# Patient Record
Sex: MALE | Race: WHITE | Employment: FULL TIME | URBAN - METROPOLITAN AREA
[De-identification: names, ages, dates, MRNs, and addresses within clinical notes are randomized per-mention and may not be internally consistent; named-entity substitution may affect disease eponyms.]

---

## 2017-09-15 ENCOUNTER — APPOINTMENT (OUTPATIENT)
Dept: OTHER | Facility: HOSPITAL | Age: 22
End: 2017-09-15
Attending: PREVENTIVE MEDICINE

## 2020-12-15 ENCOUNTER — OFFICE VISIT (OUTPATIENT)
Dept: ORTHOPEDICS CLINIC | Facility: CLINIC | Age: 25
End: 2020-12-15
Payer: COMMERCIAL

## 2020-12-15 DIAGNOSIS — M51.36 DDD (DEGENERATIVE DISC DISEASE), LUMBAR: Primary | ICD-10-CM

## 2020-12-15 DIAGNOSIS — M51.16 RADICULOPATHY DUE TO LUMBAR INTERVERTEBRAL DISC DISORDER: ICD-10-CM

## 2020-12-15 PROCEDURE — 99212 OFFICE O/P EST SF 10 MIN: CPT | Performed by: ORTHOPAEDIC SURGERY

## 2020-12-15 RX ORDER — CHLORAL HYDRATE 500 MG
CAPSULE ORAL
COMMUNITY

## 2020-12-15 RX ORDER — B-COMPLEX WITH VITAMIN C
1 TABLET ORAL DAILY
COMMUNITY

## 2020-12-15 RX ORDER — NAPROXEN 500 MG/1
500 TABLET ORAL 2 TIMES DAILY WITH MEALS
COMMUNITY
Start: 2020-07-14

## 2020-12-15 NOTE — PROGRESS NOTES
Patient is a 18-year-old white male here for follow-up. Patient had a previous diagnosis of degenerative disc disease and radiculopathy of his left leg. Patient had had surgery on his back over a year ago.   He had had a microdiscectomy and laminectomy of

## 2020-12-18 ENCOUNTER — HOSPITAL ENCOUNTER (OUTPATIENT)
Dept: MRI IMAGING | Facility: HOSPITAL | Age: 25
Discharge: HOME | End: 2020-12-18
Attending: ORTHOPAEDIC SURGERY
Payer: COMMERCIAL

## 2020-12-18 DIAGNOSIS — M51.36 DDD (DEGENERATIVE DISC DISEASE), LUMBAR: ICD-10-CM

## 2020-12-18 DIAGNOSIS — M51.16 RADICULOPATHY DUE TO LUMBAR INTERVERTEBRAL DISC DISORDER: ICD-10-CM

## 2020-12-18 PROCEDURE — 72148 MRI LUMBAR SPINE W/O DYE: CPT | Performed by: ORTHOPAEDIC SURGERY

## 2021-01-20 ENCOUNTER — OFFICE VISIT (OUTPATIENT)
Dept: ORTHOPEDICS CLINIC | Facility: CLINIC | Age: 26
End: 2021-01-20
Payer: COMMERCIAL

## 2021-01-20 DIAGNOSIS — M51.16 RADICULOPATHY DUE TO LUMBAR INTERVERTEBRAL DISC DISORDER: ICD-10-CM

## 2021-01-20 DIAGNOSIS — M51.36 DDD (DEGENERATIVE DISC DISEASE), LUMBAR: Primary | ICD-10-CM

## 2021-01-20 DIAGNOSIS — M51.26 POSTERIOR HERNIATION OF LUMBAR DISC: ICD-10-CM

## 2021-01-20 PROCEDURE — 99213 OFFICE O/P EST LOW 20 MIN: CPT | Performed by: ORTHOPAEDIC SURGERY

## 2021-01-20 RX ORDER — HYDROCODONE BITARTRATE AND ACETAMINOPHEN 5; 325 MG/1; MG/1
1 TABLET ORAL EVERY 6 HOURS PRN
COMMUNITY

## 2021-01-20 NOTE — PROGRESS NOTES
Patient is a 60-year-old white male here for follow-up on his MRI scan of his lumbar spine. Patient has had prior surgery of his back for a disc herniation at L5-S1. That surgery was approximately a year and 1/2 to 2 years ago.   Subsequent to this he has

## 2021-01-22 ENCOUNTER — TELEPHONE (OUTPATIENT)
Dept: ORTHOPEDICS CLINIC | Facility: CLINIC | Age: 26
End: 2021-01-22

## 2021-01-22 NOTE — TELEPHONE ENCOUNTER
Per Dr. Shawanda Quevedo note, referred to back specialist and was given the names of 2 orthopedic surgeons who do back surgery and to neurosurgeon who does back surgery. Also given name of Dr. Ashlee escobar at [facility].       You advised pt to follow up with you a

## 2021-01-22 NOTE — TELEPHONE ENCOUNTER
I called and spoke with Richard Brock telling him it would be best to see one of the back surgeons at this point. Patient verbalized understanding.

## 2021-01-22 NOTE — TELEPHONE ENCOUNTER
Patient feels like he did some damage to his back further- he feels like he needs to be seen or needs another MRI - Please advise

## 2022-01-25 ENCOUNTER — APPOINTMENT (OUTPATIENT)
Dept: OTHER | Facility: HOSPITAL | Age: 27
End: 2022-01-25
Attending: PREVENTIVE MEDICINE

## 2024-02-27 ENCOUNTER — OFFICE VISIT (OUTPATIENT)
Dept: OTHER | Facility: HOSPITAL | Age: 29
End: 2024-02-27
Attending: NURSE PRACTITIONER

## 2024-02-27 DIAGNOSIS — Z00.00 ROUTINE GENERAL MEDICAL EXAMINATION AT A HEALTH CARE FACILITY: Primary | ICD-10-CM

## 2024-02-28 ENCOUNTER — HOSPITAL ENCOUNTER (OUTPATIENT)
Dept: CV DIAGNOSTICS | Facility: HOSPITAL | Age: 29
Discharge: HOME | End: 2024-02-28
Attending: NURSE PRACTITIONER

## 2024-02-28 ENCOUNTER — HOSPITAL ENCOUNTER (OUTPATIENT)
Dept: GENERAL RADIOLOGY | Facility: HOSPITAL | Age: 29
Discharge: HOME | End: 2024-02-28
Attending: EMERGENCY MEDICINE

## 2024-02-28 ENCOUNTER — OFFICE VISIT (OUTPATIENT)
Dept: OTHER | Facility: HOSPITAL | Age: 29
End: 2024-02-28
Attending: NURSE PRACTITIONER

## 2024-02-28 DIAGNOSIS — Z00.00 ROUTINE GENERAL MEDICAL EXAMINATION AT A HEALTH CARE FACILITY: ICD-10-CM

## 2024-02-28 DIAGNOSIS — Z00.00 ROUTINE GENERAL MEDICAL EXAMINATION AT A HEALTH CARE FACILITY: Primary | ICD-10-CM

## 2024-02-28 LAB
% OF MAX PREDICTED HR: 100 %
ALBUMIN SERPL-MCNC: 4.5 G/DL (ref 3.2–4.8)
ALBUMIN/GLOB SERPL: 1.4 {RATIO} (ref 1–2)
ALP LIVER SERPL-CCNC: 84 U/L
ALT SERPL-CCNC: 19 U/L
ANION GAP SERPL CALC-SCNC: 6 MMOL/L (ref 0–18)
AST SERPL-CCNC: 21 U/L (ref ?–34)
BASOPHILS # BLD AUTO: 0.03 X10(3) UL (ref 0–0.2)
BASOPHILS NFR BLD AUTO: 0.6 %
BILIRUB SERPL-MCNC: 0.7 MG/DL (ref 0.3–1.2)
BILIRUB UR QL: NEGATIVE
BUN BLD-MCNC: 16 MG/DL (ref 9–23)
BUN/CREAT SERPL: 16 (ref 10–20)
CALCIUM BLD-MCNC: 9.3 MG/DL (ref 8.7–10.4)
CHLORIDE SERPL-SCNC: 109 MMOL/L (ref 98–112)
CHOLEST SERPL-MCNC: 135 MG/DL (ref ?–200)
CLARITY UR: CLEAR
CO2 SERPL-SCNC: 27 MMOL/L (ref 21–32)
CREAT BLD-MCNC: 1 MG/DL
DEPRECATED RDW RBC AUTO: 44.7 FL (ref 35.1–46.3)
EGFRCR SERPLBLD CKD-EPI 2021: 106 ML/MIN/1.73M2 (ref 60–?)
EOSINOPHIL # BLD AUTO: 0.15 X10(3) UL (ref 0–0.7)
EOSINOPHIL NFR BLD AUTO: 2.8 %
ERYTHROCYTE [DISTWIDTH] IN BLOOD BY AUTOMATED COUNT: 12.4 % (ref 11–15)
FASTING PATIENT LIPID ANSWER: YES
FASTING STATUS PATIENT QL REPORTED: YES
GLOBULIN PLAS-MCNC: 3.2 G/DL (ref 2.8–4.4)
GLUCOSE BLD-MCNC: 95 MG/DL (ref 70–99)
GLUCOSE UR-MCNC: NORMAL MG/DL
HCT VFR BLD AUTO: 43.4 %
HCV AB SERPL QL IA: NONREACTIVE
HDLC SERPL-MCNC: 44 MG/DL (ref 40–59)
HGB BLD-MCNC: 14.7 G/DL
HGB UR QL STRIP.AUTO: NEGATIVE
IMM GRANULOCYTES # BLD AUTO: 0 X10(3) UL (ref 0–1)
IMM GRANULOCYTES NFR BLD: 0 %
KETONES UR-MCNC: NEGATIVE MG/DL
LDLC SERPL CALC-MCNC: 79 MG/DL (ref ?–100)
LEUKOCYTE ESTERASE UR QL STRIP.AUTO: NEGATIVE
LYMPHOCYTES # BLD AUTO: 2.29 X10(3) UL (ref 1–4)
LYMPHOCYTES NFR BLD AUTO: 43 %
MAX DIASTOLIC BP: 70 MMHG
MAX HEART RATE: 173 BPM
MAX PREDICTED HEART RATE: 193 BPM
MAX SYSTOLIC BP: 185 MMHG
MAX WORK LOAD: 156
MCH RBC QN AUTO: 33 PG (ref 26–34)
MCHC RBC AUTO-ENTMCNC: 33.9 G/DL (ref 31–37)
MCV RBC AUTO: 97.3 FL
MONOCYTES # BLD AUTO: 0.51 X10(3) UL (ref 0.1–1)
MONOCYTES NFR BLD AUTO: 9.6 %
NEUTROPHILS # BLD AUTO: 2.35 X10 (3) UL (ref 1.5–7.7)
NEUTROPHILS # BLD AUTO: 2.35 X10(3) UL (ref 1.5–7.7)
NEUTROPHILS NFR BLD AUTO: 44 %
NITRITE UR QL STRIP.AUTO: NEGATIVE
NONHDLC SERPL-MCNC: 91 MG/DL (ref ?–130)
OSMOLALITY SERPL CALC.SUM OF ELEC: 295 MOSM/KG (ref 275–295)
PH UR: 6.5 [PH] (ref 5–8)
PLATELET # BLD AUTO: 261 10(3)UL (ref 150–450)
POTASSIUM SERPL-SCNC: 4.2 MMOL/L (ref 3.5–5.1)
PROT SERPL-MCNC: 7.7 G/DL (ref 5.7–8.2)
PROT UR-MCNC: NEGATIVE MG/DL
RBC # BLD AUTO: 4.46 X10(6)UL
SODIUM SERPL-SCNC: 142 MMOL/L (ref 136–145)
SP GR UR STRIP: 1.02 (ref 1–1.03)
TRIGL SERPL-MCNC: 56 MG/DL (ref 30–149)
UROBILINOGEN UR STRIP-ACNC: NORMAL
VLDLC SERPL CALC-MCNC: 9 MG/DL (ref 0–30)
WBC # BLD AUTO: 5.3 X10(3) UL (ref 4–11)

## 2024-02-28 PROCEDURE — 80061 LIPID PANEL: CPT

## 2024-02-28 PROCEDURE — 93018 CV STRESS TEST I&R ONLY: CPT | Performed by: INTERNAL MEDICINE

## 2024-02-28 PROCEDURE — 86803 HEPATITIS C AB TEST: CPT

## 2024-02-28 PROCEDURE — 81003 URINALYSIS AUTO W/O SCOPE: CPT

## 2024-02-28 PROCEDURE — 93016 CV STRESS TEST SUPVJ ONLY: CPT | Performed by: INTERNAL MEDICINE

## 2024-02-28 PROCEDURE — 86480 TB TEST CELL IMMUN MEASURE: CPT

## 2024-02-28 PROCEDURE — 80053 COMPREHEN METABOLIC PANEL: CPT

## 2024-02-28 PROCEDURE — 71046 X-RAY EXAM CHEST 2 VIEWS: CPT | Performed by: EMERGENCY MEDICINE

## 2024-02-28 PROCEDURE — 85025 COMPLETE CBC W/AUTO DIFF WBC: CPT

## 2024-02-29 LAB
M TB IFN-G CD4+ T-CELLS BLD-ACNC: 0.01 IU/ML
M TB TUBERC IFN-G BLD QL: NEGATIVE
M TB TUBERC IGNF/MITOGEN IGNF CONTROL: >10 IU/ML
QFT TB1 AG MINUS NIL: 0.02 IU/ML
QFT TB2 AG MINUS NIL: 0.07 IU/ML

## 2024-03-07 ENCOUNTER — NURSE ONLY (OUTPATIENT)
Dept: PHYSICAL THERAPY | Facility: HOSPITAL | Age: 29
End: 2024-03-07
Payer: COMMERCIAL

## 2024-09-28 ENCOUNTER — APPOINTMENT (OUTPATIENT)
Dept: GENERAL RADIOLOGY | Facility: HOSPITAL | Age: 29
End: 2024-09-28
Attending: EMERGENCY MEDICINE
Payer: OTHER MISCELLANEOUS

## 2024-09-28 ENCOUNTER — HOSPITAL ENCOUNTER (EMERGENCY)
Facility: HOSPITAL | Age: 29
Discharge: HOME | End: 2024-09-28
Attending: EMERGENCY MEDICINE
Payer: OTHER MISCELLANEOUS

## 2024-09-28 VITALS
OXYGEN SATURATION: 99 % | SYSTOLIC BLOOD PRESSURE: 156 MMHG | DIASTOLIC BLOOD PRESSURE: 84 MMHG | RESPIRATION RATE: 16 BRPM | HEART RATE: 77 BPM | TEMPERATURE: 98 F

## 2024-09-28 DIAGNOSIS — S93.401A SPRAIN OF RIGHT ANKLE, UNSPECIFIED LIGAMENT, INITIAL ENCOUNTER: Primary | ICD-10-CM

## 2024-09-28 LAB
AMPHET UR QL SCN: NEGATIVE
BENZODIAZ UR QL SCN: NEGATIVE
CANNABINOIDS UR QL SCN: NEGATIVE
COCAINE UR QL: NEGATIVE
CREAT UR-SCNC: 127.9 MG/DL
ETHANOL SERPL-MCNC: <3 MG/DL (ref ?–3)
FENTANYL UR QL SCN: NEGATIVE
MDMA UR QL SCN: NEGATIVE
OPIATES UR QL SCN: NEGATIVE
OXYCODONE UR QL SCN: NEGATIVE

## 2024-09-28 PROCEDURE — 80307 DRUG TEST PRSMV CHEM ANLYZR: CPT | Performed by: EMERGENCY MEDICINE

## 2024-09-28 PROCEDURE — 99283 EMERGENCY DEPT VISIT LOW MDM: CPT

## 2024-09-28 PROCEDURE — 82077 ASSAY SPEC XCP UR&BREATH IA: CPT | Performed by: EMERGENCY MEDICINE

## 2024-09-28 PROCEDURE — 99284 EMERGENCY DEPT VISIT MOD MDM: CPT

## 2024-09-28 PROCEDURE — 73610 X-RAY EXAM OF ANKLE: CPT | Performed by: EMERGENCY MEDICINE

## 2024-09-29 NOTE — ED PROVIDER NOTES
Patient Seen in: [facility] Emergency Department      History     Chief Complaint   Patient presents with    Leg or Foot Injury     Stated Complaint: ankle injury    Subjective:   29-year-old male, denies chronic past medical history, presents for evaluation of right ankle injury.  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  Pain to the inferior border the right lateral malleolus.  No other pain or injuries.  No knee pain.  No hip pain.  No foot pain.  Does not want anything for pain.            Objective:   Past Medical History:    Back problem    CURRENT ON PREDNISONE, HURT BACK    Poor sleep    Sinus disorder    Strep tonsillitis    RECURRENT X 3 YRS              Past Surgical History:   Procedure Laterality Date    Other      WISDOM TEETH    Tonsillectomy Bilateral 12/26/2016    Procedure: TONSILLECTOMY;  Surgeon: Gregg Luna MD;  Location:  MAIN OR                Social History     Socioeconomic History    Marital status: Single   Tobacco Use    Smoking status: Never    Smokeless tobacco: Never   Vaping Use    Vaping status: Every Day    Substances: Nicotine   Substance and Sexual Activity    Alcohol use: Yes     Comment: Q 2 WEEKS    Drug use: No   Other Topics Concern    Caffeine Concern No    Exercise Yes    Seat Belt Yes    Special Diet No    Stress Concern No    Weight Concern No              Review of Systems   Respiratory:  Negative for shortness of breath.    Cardiovascular:  Negative for chest pain.   Gastrointestinal:  Negative for abdominal pain.   Musculoskeletal:  Positive for arthralgias. Negative for back pain.   Neurological:  Negative for weakness and numbness.   Hematological:  Does not bruise/bleed easily.       Positive for stated Chief Complaint: Leg or Foot Injury    Other systems are as noted in HPI.  Constitutional and vital signs reviewed.      All other systems reviewed and negative except as noted above.    Physical Exam     ED Triage Vitals  [09/28/24 2153]   /84   Pulse 76   Resp 18   Temp 98.1 °F (36.7 °C)   Temp src    SpO2 98 %   O2 Device        Current Vitals:   Vital Signs  BP: 156/84  Pulse: 76  Resp: 18  Temp: 98.1 °F (36.7 °C)    Oxygen Therapy  SpO2: 98 %            Physical Exam  Vitals and nursing note reviewed.   Constitutional:       General: He is not in acute distress.     Appearance: He is not toxic-appearing.   HENT:      Head: Normocephalic and atraumatic.   Cardiovascular:      Rate and Rhythm: Normal rate and regular rhythm.      Pulses: Normal pulses.      Heart sounds: Normal heart sounds.   Pulmonary:      Effort: Pulmonary effort is normal. No respiratory distress.      Breath sounds: Normal breath sounds.   Musculoskeletal:         General: Swelling, tenderness and signs of injury present. No deformity.      Cervical back: Neck supple.   Skin:     General: Skin is warm and dry.      Capillary Refill: Capillary refill takes less than 2 seconds.      Findings: No bruising.   Neurological:      Mental Status: He is alert.      Sensory: No sensory deficit.      Coordination: Coordination normal.   Psychiatric:         Mood and Affect: Mood normal.         Behavior: Behavior normal.         Soft tissue swelling to the right lateral malleolus mostly in the inferior border where he is tender.  No pain over the foot or the calcaneus.  No pain in the fifth MTP.  2+ DP and PT pulses.  No pain in the hip or the knee.  Neurovascular intact    ED Course     Labs Reviewed   ETHYL ALCOHOL - Normal   DRUG SCREEN 8 W/OUT CONFIRMATION, URINE    Narrative:     Results of the Urine Drug Screen should be used only for medical purposes.                      MDM      XR ANKLE (MIN 3 VIEWS), RIGHT (CPT=73610)    Result Date: 9/28/2024  CONCLUSION:  No acute osseous findings.  Lateral soft tissue swelling.   LOCATION:  [facility]   Dictated by (CST): Pb Cain MD on 9/28/2024 at 10:24 PM     Finalized by (CST): Pb Cain MD on 9/28/2024 at  10:24 PM        I independently interpreted the x-ray of the right ankle without any obvious signs of acute fracture or dislocation    External chart review demonstrates outpatient orthopedic visit in January 2021, no records more recent and that to be to compare to    Work supervisor at bedside helpful to provide information on the history presenting illness    Differential diagnosis includes, but not limited to, fracture, sprain, strain, dislocation, contusion    29-year-old male with right ankle sprain.  As above.  Velcro stirrup applied.  Declines crutches.  Declines any pain medication here.  NSAIDs, rest ice compression elevation at home.  Worker's Comp. follow-up.  He consented for EtOH and urine drug screen which she needs for work.  These are negative.  Discharge home, Worker's Comp. follow-up, shared decision made utilized, return precaution provided.  Neurovascular intact      Patient was screened and evaluated during this visit.  As the treating physician attending to the patient, I determined within reasonable clinical confidence and prior to discharge, that an emergency medical condition was not or was no longer present.  There was no indication for further evaluation, treatment, or admission on an emergency basis.  Comprehensive verbal and written discharge and follow-up instructions were provided to help prevent relapse or worsening.  Patient was instructed to follow-up with their primary care provider for further evaluation and treatment, return immediately to ER for worsening, concerning, new, or changing/persisting symptoms. I discussed the case with the patient and they had no questions, complaints, or concerns.  Patient was comfortable going home.     Per the discharge paperwork, patients are encouraged to and given instructions on how to [facility], where they have access to their records, including any/all incidental findings.     This note was prepared using Cognitive Code  recognition dictation software. As a result errors may occur. When identified these errors have been corrected. While every attempt is made to correct errors during dictation discrepancies may still exist    Note to patient: The 21st Century Cures Act makes medical notes like these available to patients in the interest of transparency. However, this is a medical document intended as peer to peer communication. It is written in medical language and may contain abbreviations or verbiage that are unfamiliar. It may appear blunt or direct. Medical documents are intended to carry relevant information, facts as evident, and the clinical opinion of the practitioner.                                    Medical Decision Making      Disposition and Plan     Clinical Impression:  1. Sprain of right ankle, unspecified ligament, initial encounter         Disposition:  Discharge  9/28/2024 11:35 pm    Follow-up:  [facility]  [address] [place] [address] Dr Joseph [address] [place] [address] [phone]  Call in 2 days  Work related injury follow up          Medications Prescribed:  Current Discharge Medication List

## 2024-09-29 NOTE — ED INITIAL ASSESSMENT (HPI)
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

## 2024-09-30 ENCOUNTER — APPOINTMENT (OUTPATIENT)
Dept: OTHER | Facility: HOSPITAL | Age: 29
End: 2024-09-30
Attending: PREVENTIVE MEDICINE

## 2024-11-01 ENCOUNTER — HOSPITAL ENCOUNTER (OUTPATIENT)
Age: 29
Discharge: HOME | End: 2024-11-01
Payer: COMMERCIAL

## 2024-11-01 VITALS
RESPIRATION RATE: 18 BRPM | HEART RATE: 82 BPM | TEMPERATURE: 99 F | OXYGEN SATURATION: 99 % | SYSTOLIC BLOOD PRESSURE: 157 MMHG | DIASTOLIC BLOOD PRESSURE: 92 MMHG

## 2024-11-01 DIAGNOSIS — S29.011A PECTORALIS MUSCLE STRAIN, INITIAL ENCOUNTER: Primary | ICD-10-CM

## 2024-11-01 PROCEDURE — 99203 OFFICE O/P NEW LOW 30 MIN: CPT | Performed by: NURSE PRACTITIONER

## 2024-11-01 RX ORDER — HYDROCODONE BITARTRATE AND ACETAMINOPHEN 5; 325 MG/1; MG/1
1-2 TABLET ORAL EVERY 6 HOURS PRN
Qty: 10 TABLET | Refills: 0 | Status: SHIPPED | OUTPATIENT
Start: 2024-11-01 | End: 2024-11-08

## 2024-11-01 NOTE — ED PROVIDER NOTES
Patient Seen in: [facility]      History     Chief Complaint   Patient presents with    Pain     Stated Complaint: muscle pain - lft pec    Subjective:   29-year-old male presents to immediate care with left pectoral pain.  Patient states he felt a tear while he was lifting weights.  Patient is requesting MRI at this time feels he tore the muscle.  Denies any shortness of breath states increased pain with abduction of shoulder              Objective:     Past Medical History:    Back problem    CURRENT ON PREDNISONE, HURT BACK    Poor sleep    Sinus disorder    Strep tonsillitis    RECURRENT X 3 YRS              Past Surgical History:   Procedure Laterality Date    Other      WISDOM TEETH    Tonsillectomy Bilateral 12/26/2016    Procedure: TONSILLECTOMY;  Surgeon: Gregg Luna MD;  Location:  MAIN OR                Social History     Socioeconomic History    Marital status: Single   Tobacco Use    Smoking status: Never    Smokeless tobacco: Never   Vaping Use    Vaping status: Every Day    Substances: Nicotine   Substance and Sexual Activity    Alcohol use: Yes     Comment: Q 2 WEEKS    Drug use: No   Other Topics Concern    Caffeine Concern No    Exercise Yes    Seat Belt Yes    Special Diet No    Stress Concern No    Weight Concern No              Review of Systems   Constitutional: Negative.    Respiratory: Negative.     Cardiovascular: Negative.    Gastrointestinal: Negative.    Skin: Negative.    Neurological: Negative.        Positive for stated complaint: muscle pain - lft pec  Other systems are as noted in HPI.  Constitutional and vital signs reviewed.      All other systems reviewed and negative except as noted above.    Physical Exam     ED Triage Vitals [11/01/24 1808]   BP (!) 157/92   Pulse 82   Resp 18   Temp 98.8 °F (37.1 °C)   Temp src Temporal   SpO2 99 %   O2 Device None (Room air)       Current Vitals:   Vital Signs  BP: (!) 157/92  Pulse: 82  Resp: 18  Temp: 98.8 °F  (37.1 °C)  Temp src: Temporal    Oxygen Therapy  SpO2: 99 %  O2 Device: None (Room air)        Physical Exam  Vitals and nursing note reviewed.   Constitutional:       General: He is not in acute distress.  HENT:      Head: Normocephalic.   Cardiovascular:      Rate and Rhythm: Normal rate.   Pulmonary:      Effort: Pulmonary effort is normal.   Chest:      Chest wall: Tenderness present.       Musculoskeletal:         General: Normal range of motion.   Skin:     General: Skin is warm and dry.   Neurological:      General: No focal deficit present.      Mental Status: He is alert and oriented to person, place, and time.             ED Course   Labs Reviewed - No data to display                MDM      Medical Decision Making  Pertinent Labs & Imaging studies reviewed. (See chart for details).  Patient coming in with left pectoral pain.   Differential diagnosis includes Muscle strain  Will discharge on pain relievers, follow-up with primary care.   Patient is comfortable with this plan.     Overall Pt looks good. Non-toxic, well-hydrated and in no respiratory distress. Vital signs are reassuring. Exam is reassuring. I do not believe pt requires and additional diagnostic studies or intervention. I believe pt can be discharged home to continue evaluation as an outpatient. Follow-up provider given. Discharge instructions given and reviewed. Return for any problems. All understand and agrees with the plan.        Problems Addressed:  Pectoralis muscle strain, initial encounter: acute illness or injury    Risk  OTC drugs.  Prescription drug management.        Disposition and Plan     Clinical Impression:  1. Pectoralis muscle strain, initial encounter         Disposition:  Discharge  11/1/2024  6:18 pm    Follow-up:  Stewart Banerjee  [facility] [address] [address] [phone]                Medications Prescribed:  Discharge Medication List as of 11/1/2024  6:19 PM        START taking these  medications    Details   !! HYDROcodone-acetaminophen 5-325 MG Oral Tab Take 1-2 tablets by mouth every 6 (six) hours as needed for Pain., Normal, Disp-10 tablet, R-0       !! - Potential duplicate medications found. Please discuss with provider.              Supplementary Documentation:

## 2024-11-01 NOTE — DISCHARGE INSTRUCTIONS
3 to 7 days after the injury: Use Rest, Ice, Compression, and Elevation (RICE) to help stop bruising and decrease pain and swelling.  Rest: Rest your muscle to allow your injury to heal. When the pain decreases, begin normal, slow movements. For mild and moderate muscle strains, you should rest your muscles for about 2 days. However, if you have a severe muscle strain, you should rest for 10 to 14 days. You may need to use crutches to walk if your muscle strain is in your legs or lower body.  Ice: Put an ice pack on the injured area. Put a towel between the ice pack and your skin. Do not put the ice pack directly on your skin. You can use a package of frozen peas instead of an ice pack.  Compression: You may need to wrap an elastic bandage around the area to decrease swelling. It should be tight enough for you to feel support. Do not wrap it too tightly.  Elevation: Keep the injured muscle raised above your heart if possible. For example, if you have a strain of your lower leg muscle, lie down and prop your leg up on pillows. This helps decrease pain and swelling.    3 to 21 days after the injury: Start to slowly and regularly exercise your strained muscle. This will help it heal. If you feel pain, decrease how hard you are exercising.  1 to 6 weeks after the injury: Stretch the injured muscle. Hold the stretch for about 30 seconds. Do this 4 times a day. You may stretch the muscle until you feel a slight pull. Stop stretching if you feel pain.  2 weeks to 6 months after the injury: The goal of this phase is to return to the activity you were doing before the injury happened, without hurting the muscle again.  3 weeks to 6 months after the injury: Keep stretching and strengthening your muscles to avoid injury. Slowly increase the time and distance that you exercise. You may still have signs and symptoms of muscle strain 6 months after the injury, even if you do things to help it heal. In this case, you may need  surgery on the muscle.

## 2024-11-01 NOTE — ED INITIAL ASSESSMENT (HPI)
Pt c/o pain in left pectoral muscle, requesting MRI referral, states he was lifting weights about 1hr ago when injury occurred

## 2024-11-29 ENCOUNTER — HOSPITAL ENCOUNTER (EMERGENCY)
Facility: HOSPITAL | Age: 29
Discharge: HOME | End: 2024-11-29
Attending: EMERGENCY MEDICINE
Payer: OTHER MISCELLANEOUS

## 2024-11-29 VITALS
WEIGHT: 200 LBS | RESPIRATION RATE: 18 BRPM | SYSTOLIC BLOOD PRESSURE: 136 MMHG | DIASTOLIC BLOOD PRESSURE: 84 MMHG | BODY MASS INDEX: 26.51 KG/M2 | TEMPERATURE: 99 F | OXYGEN SATURATION: 97 % | HEART RATE: 79 BPM | HEIGHT: 73 IN

## 2024-11-29 DIAGNOSIS — Z20.89 EXPOSURE TO MENINGITIS: Primary | ICD-10-CM

## 2024-11-29 PROCEDURE — 99282 EMERGENCY DEPT VISIT SF MDM: CPT

## 2024-11-29 PROCEDURE — 99283 EMERGENCY DEPT VISIT LOW MDM: CPT

## 2024-11-29 RX ORDER — CIPROFLOXACIN 500 MG/1
500 TABLET, FILM COATED ORAL ONCE
Status: COMPLETED | OUTPATIENT
Start: 2024-11-29 | End: 2024-11-29

## 2024-11-29 NOTE — ED PROVIDER NOTES
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   No Symptoms    Objective:     Past Medical History:    Back problem    CURRENT ON PREDNISONE, HURT BACK    Poor sleep    Sinus disorder    Strep tonsillitis    RECURRENT X 3 YRS              Past Surgical History:   Procedure Laterality Date    Other      WISDOM TEETH    Tonsillectomy Bilateral 12/26/2016    Procedure: TONSILLECTOMY;  Surgeon: Gregg Luna MD;  Location:  MAIN OR                Social History     Socioeconomic History    Marital status: Single   Tobacco Use    Smoking status: Never    Smokeless tobacco: Never   Vaping Use    Vaping status: Every Day    Substances: Nicotine   Substance and Sexual Activity    Alcohol use: Yes     Comment: Q 2 WEEKS    Drug use: No   Other Topics Concern    Caffeine Concern No    Exercise Yes    Seat Belt Yes    Special Diet No    Stress Concern No    Weight Concern No                  Physical Exam     ED Triage Vitals [11/29/24 1155]   /84   Pulse 79   Resp 18   Temp 98.7 °F (37.1 °C)   Temp src Temporal   SpO2 97 %   O2 Device None (Room air)       Current Vitals:   Vital Signs  BP: 136/84  Pulse: 79  Resp: 18  Temp: 98.7 °F (37.1 °C)  Temp src: Temporal    Oxygen Therapy  SpO2: 97 %  O2 Device: None (Room air)        Physical Exam  Normal exteranl exam      ED Course   Labs Reviewed - No data to display         500 mg ciprifloxacin given       MDM              Medical Decision Making      Disposition and Plan     Clinical Impression:  1. Exposure to meningitis         Disposition:  Discharge  11/29/2024 12:02 pm    Follow-up:  [facility]  [address] [place] [id number] [phone]  Call in 3 days            Medications Prescribed:  Current Discharge Medication List               Supplementary Documentation:

## 2024-12-20 ENCOUNTER — HOSPITAL ENCOUNTER (OUTPATIENT)
Age: 29
Discharge: HOME | End: 2024-12-20
Payer: COMMERCIAL

## 2024-12-20 VITALS
HEART RATE: 70 BPM | TEMPERATURE: 98 F | SYSTOLIC BLOOD PRESSURE: 139 MMHG | DIASTOLIC BLOOD PRESSURE: 87 MMHG | RESPIRATION RATE: 20 BRPM | OXYGEN SATURATION: 99 %

## 2024-12-20 DIAGNOSIS — L29.9 PRURITUS: ICD-10-CM

## 2024-12-20 DIAGNOSIS — T81.9XXA SURGICAL SITE REACTION, INITIAL ENCOUNTER: Primary | ICD-10-CM

## 2024-12-20 LAB
#MXD IC: 0.5 X10ˆ3/UL (ref 0.1–1)
HCT VFR BLD AUTO: 41.5 %
HGB BLD-MCNC: 14.8 G/DL
LYMPHOCYTES # BLD AUTO: 1.6 X10ˆ3/UL (ref 1–4)
LYMPHOCYTES NFR BLD AUTO: 17.1 %
MCH RBC QN AUTO: 34.3 PG (ref 26–34)
MCHC RBC AUTO-ENTMCNC: 35.7 G/DL (ref 31–37)
MCV RBC AUTO: 96.3 FL (ref 80–100)
MIXED CELL %: 5.1 %
NEUTROPHILS # BLD AUTO: 7.1 X10ˆ3/UL (ref 1.5–7.7)
NEUTROPHILS NFR BLD AUTO: 77.8 %
PLATELET # BLD AUTO: 301 X10ˆ3/UL (ref 150–450)
RBC # BLD AUTO: 4.31 X10ˆ6/UL
WBC # BLD AUTO: 9.2 X10ˆ3/UL (ref 4–11)

## 2024-12-20 RX ORDER — DOXYCYCLINE 100 MG/1
100 CAPSULE ORAL 2 TIMES DAILY
COMMUNITY

## 2024-12-20 RX ORDER — FAMOTIDINE 20 MG/1
20 TABLET, FILM COATED ORAL ONCE
Status: COMPLETED | OUTPATIENT
Start: 2024-12-20 | End: 2024-12-20

## 2024-12-20 RX ORDER — MUPIROCIN 20 MG/G
1 OINTMENT TOPICAL 3 TIMES DAILY
Qty: 1 EACH | Refills: 0 | Status: SHIPPED | OUTPATIENT
Start: 2024-12-20 | End: 2025-01-03

## 2024-12-20 RX ORDER — DIPHENHYDRAMINE HCL 25 MG
25 CAPSULE ORAL ONCE
Status: COMPLETED | OUTPATIENT
Start: 2024-12-20 | End: 2024-12-20

## 2024-12-20 NOTE — ED INITIAL ASSESSMENT (HPI)
Patient states 8 days ago had surgery to his left arm- on Doxycycline.    Patient states itchy rash developed this am

## 2024-12-20 NOTE — ED PROVIDER NOTES
Patient Seen in: [facility]      History     Chief Complaint   Patient presents with    Rash     Stated Complaint: rash    Subjective:   HPI      29-year-old male here with complaint of redness and itchiness around his surgical site.  Patient had shoulder repair on 12/12 at [place] with Dr.Jourdan Samaniego.  Patient is currently on doxycycline for infection.  Patient was applying Polysporin with 4 x 4's and medical tape.  Patient was unable to contact the surgery office to have a look at the wound site.  Patient denies any injury trauma to the wound site.  Patient denies chest pain, shortness of breath, cough, abdominal pain, nausea, vomiting or diarrhea.  Patient denies lip swelling throat itching wheezing etc.  Afebrile.    Objective:     Past Medical History:    Back problem    CURRENT ON PREDNISONE, HURT BACK    Poor sleep    Sinus disorder    Strep tonsillitis    RECURRENT X 3 YRS            The patient's medication list, past medical history and social history elements  as listed in today's nurse's notes are reviewed and agree.   The patient's family history is reviewed and is noncontributory to the presenting problem, except as indicated as above.     Past Surgical History:   Procedure Laterality Date    Other      WISDOM TEETH    Tonsillectomy Bilateral 12/26/2016    Procedure: TONSILLECTOMY;  Surgeon: Gregg Luna MD;  Location:  MAIN OR                Social History     Socioeconomic History    Marital status: Single   Tobacco Use    Smoking status: Never     Passive exposure: Never    Smokeless tobacco: Never   Vaping Use    Vaping status: Every Day    Substances: Nicotine   Substance and Sexual Activity    Alcohol use: Yes     Comment: Q 2 WEEKS    Drug use: No   Other Topics Concern    Caffeine Concern No    Exercise Yes    Seat Belt Yes    Special Diet No    Stress Concern No    Weight Concern No              Review of Systems    Positive for stated complaint: rash  Other  systems are as noted in HPI.  Constitutional and vital signs reviewed.      All other systems reviewed and negative except as noted above.    Physical Exam     ED Triage Vitals [12/20/24 1719]   /87   Pulse 70   Resp 20   Temp 98.3 °F (36.8 °C)   Temp src Oral   SpO2 99 %   O2 Device None (Room air)       Current Vitals:   Vital Signs  BP: 139/87  Pulse: 70  Resp: 20  Temp: 98.3 °F (36.8 °C)  Temp src: Oral    Oxygen Therapy  SpO2: 99 %  O2 Device: None (Room air)        Physical Exam  Vitals and nursing note reviewed.   Constitutional:       Appearance: Normal appearance. He is well-developed.   HENT:      Head: Normocephalic.      Right Ear: External ear normal.      Left Ear: External ear normal.      Nose: Nose normal.      Mouth/Throat:      Mouth: Mucous membranes are moist.   Eyes:      Conjunctiva/sclera: Conjunctivae normal.      Pupils: Pupils are equal, round, and reactive to light.   Cardiovascular:      Rate and Rhythm: Normal rate and regular rhythm.      Heart sounds: Normal heart sounds.   Pulmonary:      Effort: Pulmonary effort is normal.      Breath sounds: Normal breath sounds.   Musculoskeletal:      Right shoulder: Normal.      Cervical back: Normal range of motion and neck supple.      Comments: L shoulder: vertical incision with erythema at the site: no drainage or streaking noted: N/V intact:  See image   Skin:     General: Skin is warm.      Capillary Refill: Capillary refill takes less than 2 seconds.   Neurological:      General: No focal deficit present.      Mental Status: He is alert and oriented to person, place, and time.   Psychiatric:         Mood and Affect: Mood normal.         Behavior: Behavior normal.         Thought Content: Thought content normal.         Judgment: Judgment normal.           ED Course     Labs Reviewed   POCT CBC - Abnormal; Notable for the following components:       Result Value    MCH IC 34.3 (*)     All other components within normal limits      NOTE: CBC is within normal limits.  He is on doxycycline it does not look like a surgical infection.  There is no drainage or streaking etc.  Patient's vital signs are stable he is afebrile nontoxic tachycardic.  It appears to be a local site reaction most likely from the medical tape and possibly Polysporin.  Mupirocin was sent in.  Patient was advised to use non-Adaptic and will wear the 4 x 4's and Ace wrap over it and lieu of medical tape.  Patient also due to wound healing declines any steroids will take Benadryl over-the-counter for the itching.  Patient will contact his surgeon for further evaluation and treatment.              MDM   Clinical Impression: surgical site reaction/itching  Course of Treatment:     Take Benadryl over-the-counter.  Lubricate the area with the mupirocin.  Do not use any medical tape use none Adaptic as well as Ace wrap.  Allow it to air dry at night keep covered as advised by the surgeon during the day.  If anything changes i.e. increased redness swelling drainage or fevers go directly to the emergency room and contact your surgeon for further evaluation and treatment.          The patient is encouraged to return if any concerning symptoms arise. Additional verbal discharge instructions are given and discussed. Discharge medications are discussed. The patient is in good condition throughout the visit today and remains so upon discharge. I discuss the plan of care with the patient, who expresses understanding. All questions and concerns are addressed to the patient's satisfaction prior to discharge today.  Previous conversations with PCP and charts were reviewed.                Disposition and Plan     Clinical Impression:  1. Surgical site reaction, initial encounter    2. Pruritus         Disposition:  Discharge  12/20/2024  6:28 pm    Follow-up:  Stewart Banerjee  [facility] [address], [address] [address] [phone]                Medications Prescribed:  Current  Discharge Medication List        START taking these medications    Details   mupirocin 2 % External Ointment Apply 1 Application topically 3 (three) times daily for 14 days.  Qty: 1 each, Refills: 0                 Supplementary Documentation:

## 2024-12-21 NOTE — DISCHARGE INSTRUCTIONS
Please return to the ER/clinic if symptoms worsen. Follow-up with your PCP in 24-48 hours as needed.    Take Benadryl over-the-counter.  Lubricate the area with the mupirocin.  Do not use any medical tape use none Adaptic as well as Ace wrap.  Allow it to air dry at night keep covered as advised by the surgeon during the day.  If anything changes i.e. increased redness swelling drainage or fevers go directly to the emergency room and contact your surgeon for further evaluation and treatment.

## (undated) NOTE — LETTER
Date & Time: 9/28/2024, 11:30 PM  Patient: Doyle Ahn  Encounter Provider(s):    Amol Godwin DO       To Whom It May Concern:    Doyle Ahn was seen and treated in our department on 9/28/2024. He should not return to work until cleared by worker's comp .    If you have any questions or concerns, please do not hesitate to call.        _____________________________  Physician/[facility] Signature